# Patient Record
Sex: FEMALE | Race: WHITE | Employment: FULL TIME | URBAN - METROPOLITAN AREA
[De-identification: names, ages, dates, MRNs, and addresses within clinical notes are randomized per-mention and may not be internally consistent; named-entity substitution may affect disease eponyms.]

---

## 2018-06-12 ENCOUNTER — OFFICE VISIT (OUTPATIENT)
Dept: ORTHOPEDIC SURGERY | Facility: CLINIC | Age: 53
End: 2018-06-12

## 2018-06-12 VITALS
HEART RATE: 72 BPM | BODY MASS INDEX: 31.83 KG/M2 | DIASTOLIC BLOOD PRESSURE: 88 MMHG | RESPIRATION RATE: 18 BRPM | WEIGHT: 173 LBS | SYSTOLIC BLOOD PRESSURE: 145 MMHG | OXYGEN SATURATION: 99 % | HEIGHT: 62 IN | TEMPERATURE: 97.4 F

## 2018-06-12 DIAGNOSIS — M77.8 LEFT SHOULDER TENDONITIS: Primary | ICD-10-CM

## 2018-06-12 DIAGNOSIS — M75.52 ACUTE SHOULDER BURSITIS, LEFT: ICD-10-CM

## 2018-06-12 DIAGNOSIS — M25.512 ACUTE PAIN OF LEFT SHOULDER: ICD-10-CM

## 2018-06-12 RX ORDER — ASPIRIN 325 MG
TABLET, DELAYED RELEASE (ENTERIC COATED) ORAL
COMMUNITY

## 2018-06-12 RX ORDER — BISMUTH SUBSALICYLATE 262 MG
1 TABLET,CHEWABLE ORAL DAILY
COMMUNITY

## 2018-06-12 RX ORDER — MICONAZOLE NITRATE 2 %
CREAM WITH APPLICATOR VAGINAL 2 TIMES DAILY
COMMUNITY

## 2018-06-12 RX ORDER — METHYLPREDNISOLONE ACETATE 40 MG/ML
40 INJECTION, SUSPENSION INTRA-ARTICULAR; INTRALESIONAL; INTRAMUSCULAR; SOFT TISSUE ONCE
Qty: 1 VIAL | Refills: 0
Start: 2018-06-12 | End: 2018-06-12

## 2018-06-12 NOTE — PROGRESS NOTES
HISTORY OF PRESENT ILLNESS:  Liane Brown is a pleasant, 78-year-old, obese,  female, who presents with a two-month history of worsening left shoulder pain. She has a history dating back a couple of months ago of her dog, who was on a leash, that jerked as she was trying to put the animal in her car. The subsequent jerking caused her full extension to her left side of her left shoulder. She developed intermittent pain following that occurrence. She is now limited two months later with an inability to reach behind her back secondary to pain and stiffness. She also has difficulty raising her left arm above her head when compared to the right side. She has tried over-the-counter Tylenol and Motrin with no symptom relief or improvement in her range of motion. REVIEW OF SYSTEMS:  No chest pain. No shortness of breath. No fevers, chills, or night sweats. No rash and no itching. No nausea and no vomiting. Pain is per the HPI with the addition of decreased range of motion reaching forward and behind her. Her pain at rest is 2-3/10 to the left shoulder and with activities, particularly forward and internally rotating, the pain increases to upwards of a 7-8/10. PHYSICAL EXAM:  She is a healthy-appearing, well-developed, well-nourished, pleasant, 78-year-old, obese,  female, atraumatic, normocephalic, alert and oriented times three, sitting on the table comfortably. She is in a gown with toga presentation on the left. The left shoulder reveals tenderness over the distal AC joint. She is limited actively in her forward flexion to only 140° with a painful arc beginning at 110° to endpoint. She has 90° of glenohumeral abduction but begins with pain at 70° to endpoint. She has a negative empty can sign with the left upper extremity. She has poor internal rotation actively, which is barely at L4 when compared to the right, which is easily T2-T3.   Her passive external rotation on the left is painful throughout with a maximum endpoint at 25° passively. This is compared to the right, which is easily to 85° passively without pain. She has a positive impingement sign. RADIOGRAPHS:  X-rays today, three views of the right shoulder, reveal no evidence of osseous deformity. There are minimal osteoarthritic changes seen in the glenohumeral joint and minimal [facility]. There is no subluxation in the shoulder noted at the humeral head and the glenohumeral joint. IMPRESSION:      1. Left shoulder tendinitis/bursitis, impingement. Early impingement sign. 2. Decreased range of motion of the left shoulder. PROCEDURE:  Using sterile technique, after informed verbal and written consent were obtained and time out performed, 1 of Depo Medrol at 80 mg per mL mixed with 7 mL of Marcaine 0.25% was injected using the posterior subacromial approach. There were no complications. The patient tolerated the procedure well. PLAN:   I am currently recommending a low-dose cortisone injection for her left shoulder discomfort today. The patient is going to begin planned outpatient physical therapy services with all modalities with a focus on stretching and range of motion. We are going to see her back in about three weeks. Today, x-rays were reviewed, and all her questions were answered to her satisfaction. Copies were provided.

## 2018-06-13 ENCOUNTER — HOSPITAL ENCOUNTER (OUTPATIENT)
Dept: PHYSICAL THERAPY | Age: 53
Discharge: HOME | End: 2018-06-13
Payer: COMMERCIAL

## 2018-06-13 PROCEDURE — 97161 PT EVAL LOW COMPLEX 20 MIN: CPT | Performed by: PHYSICAL THERAPIST

## 2018-06-13 PROCEDURE — 97110 THERAPEUTIC EXERCISES: CPT | Performed by: PHYSICAL THERAPIST

## 2018-06-13 NOTE — PROGRESS NOTES
PT DAILY TREATMENT NOTE     Patient Name: Desire Vernon  Date:2018  : 1965  [x]  Patient  Verified  Payor: Simon Duval / [facility] / Product Type: Commerical /    In time:1205  Out time:1240  Total Treatment Time (min): 35  Visit #: 1 of 10    Treatment Area: Pain in left shoulder [M25.512]    SUBJECTIVE  Pain Level (0-10 scale): 4  Any medication changes, allergies to medications, adverse drug reactions, diagnosis change, or new procedure performed?: [x] No    [] Yes (see summary sheet for update)  Subjective functional status/changes:   [] No changes reported  See eval.    OBJECTIVE    10 min [x]Eval                  []Re-Eval       25 min Therapeutic Exercise:  [] See flow sheet :   Rationale: increase ROM, increase strength, improve coordination and increase proprioception to improve the patients ability to tolerate overhead and behind back tasks with reduced pain. With   [] TE   [] TA   [] neuro   [] other: Patient Education: [x] Review HEP    [] Progressed/Changed HEP based on:   [] positioning   [] body mechanics   [] transfers   [] heat/ice application    [] other:      Other Objective/Functional Measures: See eval.     Pain Level (0-10 scale) post treatment: 0    ASSESSMENT/Changes in Function:   [x]  See Plan of Care           Progress towards goals / Updated goals:  Short Term Goals: To be accomplished in 2 weeks:  1. Pt to report [place] with HEP. Eval status: HEP issued and reviewed physically/verbally with pt    Long Term Goals: To be accomplished in 4 weeks:  1. Pt to report score of 71 on FOTO, indicating improved tolerance to activity and reduced pain. Eval status: 56 on initial FOTO  2.   Pt to demonstrate full L shoulder AROM/PROM at 160 degrees flexion/abduction, 75 degrees ER (90/90), able to reach C7 with IR behind the back  Eval status: L shoulder AROM 131 degrees flexion, 101 degrees abduction, 58 degrees ER (90/90), able to reach to midline central LB with behind the back IR  3. Pt to be able to return to full PLOF at home and in the community, including swimming activities at home, with improved mobility, strength and stability. Eval status: limited by pain during swimming strokes  4. Pt to be able to perform overhead and upper body hygiene/dressing tasks without increased c/o pain or difficulty.   Eval status: limited by pain when attempting to fasten bra    PLAN  [x]  Upgrade activities as tolerated     [x]  Continue plan of care  []  Update interventions per flow sheet       []  Discharge due to:_  []  Other:_      Lakia Luna, PT 6/13/2018  12:30 PM    Future Appointments  Date Time Provider Jackie Osman   7/11/2018 8:30 AM VIVIANA Guillen

## 2018-06-13 NOTE — PROGRESS NOTES
[facility] [address]  [address] [facility] [address], [address] [phone] fax    Plan of Care/ Statement of Necessity for Physical Therapy Services           Patient name: Milagros Grullon Start of Care: 2018   Referral source: Thomas Ulloa MD : 1965    Medical Diagnosis: Pain in left shoulder [M25.512]   Onset Date:2018    Treatment Diagnosis: L shoulder pain   Prior Hospitalization: see medical history Provider#: [id number]   Medications: Verified on Patient summary List    Comorbidities: high BP, tobacco use, thyroid problems   Prior Level of Function: Pt reports [place] with all tasks. She lives at home with her family and a 16 month old labradoodle. She works full time at [employer] at [employer]. The Plan of Care and following information is based on the information from the initial evaluation. Assessment/ key information: Pt is a 44 y/o female who presents today with new onset of L shoulder pain beginning in 2018 that is slightly worsening/not improving. Pt reports that her onset of pain began when her puppy pulled her arm the wrong way while the puppy was on a leash. Pt has had x-rays which show no tears, and an injection yesterday, which slightly reduced her pain last night. Pt presents with reduced ROM and slight reduction in strength with MMT. Pt with negative impingement testing, lift off testing, drop arm testing, crank/clunk testing. She has very slight tenderness to palpation through the posterior shoulder. She exhibits altered scapulohumeral rhythm with increased shoulder elevation during motion. Pt would benefit from skilled PT intervention to address the above limitations and return her to full PLOF.   Evaluation Complexity History HIGH Complexity :3+ comorbidities / personal factors will impact the outcome/ POC ; Examination HIGH Complexity : 4+ Standardized tests and measures addressing body structure, function, activity limitation and / or participation in recreation  ;Presentation LOW Complexity : Stable, uncomplicated  ;Clinical Decision Making MEDIUM Complexity : FOTO score of 26-74  Overall Complexity Rating: LOW   Problem List: pain affecting function, decrease ROM, decrease strength, decrease ADL/ functional abilitiies, decrease activity tolerance and decrease flexibility/ joint mobility   Treatment Plan may include any combination of the following: Therapeutic exercise, Therapeutic activities, Neuromuscular re-education, Manual therapy, Patient education, Self Care training and Functional mobility training  Patient / Family readiness to learn indicated by: asking questions, trying to perform skills and interest  Persons(s) to be included in education: patient (P)  Barriers to Learning/Limitations: None  Patient Goal (s): regain ROM and get rid of pain  Patient Self Reported Health Status: good  Rehabilitation Potential: good  Short Term Goals: To be accomplished in 2 weeks:  1. Pt to report [place] with HEP. Eval status: HEP issued and reviewed physically/verbally with pt    Long Term Goals: To be accomplished in 4 weeks:  1. Pt to report score of 71 on FOTO, indicating improved tolerance to activity and reduced pain. Eval status: 56 on initial FOTO  2. Pt to demonstrate full L shoulder AROM/PROM at 160 degrees flexion/abduction, 75 degrees ER (90/90), able to reach C7 with IR behind the back  Eval status: L shoulder AROM 131 degrees flexion, 101 degrees abduction, 58 degrees ER (90/90), able to reach to midline central LB with behind the back IR  3. Pt to be able to return to full PLOF at home and in the community, including swimming activities at home, with improved mobility, strength and stability. Eval status: limited by pain during swimming strokes  4.   Pt to be able to perform overhead and upper body hygiene/dressing tasks without increased c/o pain or difficulty. Eval status: limited by pain when attempting to fasten bra  Frequency / Duration: Patient to be seen 2 times per week for 10 treatments. Patient/ Caregiver education and instruction: Diagnosis, prognosis, self care, activity modification and exercises   [x]  Plan of care has been reviewed with VLADIMIR Villegas, PT 6/13/2018 12:45 PM    ________________________________________________________________________    I certify that the above Therapy Services are being furnished while the patient is under my care. I agree with the treatment plan and certify that this therapy is necessary.     Physician's Signature:____________________  Date:____________Time: _________    Please sign and return to [facility] [address]  [address] [place], [address] [phone] fax

## 2018-06-15 ENCOUNTER — HOSPITAL ENCOUNTER (OUTPATIENT)
Dept: PHYSICAL THERAPY | Age: 53
Discharge: HOME | End: 2018-06-15
Payer: COMMERCIAL

## 2018-06-15 PROCEDURE — 97140 MANUAL THERAPY 1/> REGIONS: CPT

## 2018-06-15 PROCEDURE — 97110 THERAPEUTIC EXERCISES: CPT

## 2018-06-15 NOTE — PROGRESS NOTES
PT DAILY TREATMENT NOTE     Patient Name: Milagros Grullon  Date:6/15/2018  : 1965  [x]  Patient  Verified  Payor: Parveen Vera / [place]: [place] [facility] / Product Type: Commerical /    In time:834  Out time:910  Total Treatment Time (min): 36  Visit #: 2 of 10    Treatment Area: Pain in left shoulder [M25.512]    SUBJECTIVE  Pain Level (0-10 scale): 2  Any medication changes, allergies to medications, adverse drug reactions, diagnosis change, or new procedure performed?: [x] No    [] Yes (see summary sheet for update)  Subjective functional status/changes:   [] No changes reported  \"I have the most trouble with reaching out to the side and behind me. \"    OBJECTIVE    Modality rationale: patient declined   Min Type Additional Details    [] Estim:  []Unatt       []IFC  []Premod                        []Other:  []w/ice   []w/heat  Position:  Location:    [] Estim: []Att    []TENS instruct  []NMES                    []Other:  []w/US   []w/ice   []w/heat  Position:  Location:    []  Traction: [] Cervical       []Lumbar                       [] Prone          []Supine                       []Intermittent   []Continuous Lbs:  [] before manual  [] after manual    []  Ultrasound: []Continuous   [] Pulsed                           []1MHz   []3MHz W/cm2:  Location:    []  Iontophoresis with dexamethasone         Location: [] Take home patch   [] In clinic    []  Ice     []  heat  []  Ice massage  []  Laser   []  Anodyne Position:  Location:    []  Laser with stim  []  Other:  Position:  Location:    []  Vasopneumatic Device Pressure:       [] lo [] med [] hi   Temperature: [] lo [] med [] hi   [] Skin assessment post-treatment:  []intact []redness- no adverse reaction    []redness  adverse reaction:     28 min Therapeutic Exercise:  [x] See flow sheet :   Rationale: increase ROM and increase strength to improve the patients ability to perform ADLs    8 min Manual Therapy:  Sidelying scap mobs to left scapula, STM to left UT and medial border. PROM to left shoulder with gentle grade 1 & 2 mobs   Rationale: decrease pain, increase ROM, increase tissue extensibility, decrease trigger points and increase postural awareness to improve mobility and reaching        With   [x] TE   [] TA   [x] neuro   [] other: Patient Education: [x] Review HEP    [] Progressed/Changed HEP based on:   [x] positioning   [x] body mechanics   [] transfers   [] heat/ice application    [] other:      Other Objective/Functional Measures:      Pain Level (0-10 scale) post treatment: 0    ASSESSMENT/Changes in Function: Initiated POC to which pt responded well. She had full ROM through her left shoulder. She reports the most difficulty is with reaching into abduction and behind herself. Patient will continue to benefit from skilled PT services to modify and progress therapeutic interventions, address functional mobility deficits, address ROM deficits, address strength deficits, analyze and address soft tissue restrictions, analyze and cue movement patterns, analyze and modify body mechanics/ergonomics, assess and modify postural abnormalities, address imbalance/dizziness and instruct in home and community integration to attain remaining goals. [x]  See Plan of Care  []  See progress note/recertification  []  See Discharge Summary         Progress towards goals / Updated goals:  Short Term Goals: To be accomplished in 2 weeks:  1. Pt to report [place] with HEP. Eval status: HEP issued and reviewed physically/verbally with pt    MET  Long Term Goals: To be accomplished in 4 weeks:  1. Pt to report score of 71 on FOTO, indicating improved tolerance to activity and reduced pain. Eval status: 56 on initial FOTO  2.   Pt to demonstrate full L shoulder AROM/PROM at 160 degrees flexion/abduction, 75 degrees ER (90/90), able to reach C7 with IR behind the back   Eval status: L shoulder AROM 131 degrees flexion, 101 degrees abduction, 58 degrees ER (90/90), able to reach to midline central LB with behind the back IR  3. Pt to be able to return to full PLOF at home and in the community, including swimming activities at home, with improved mobility, strength and stability. Eval status: limited by pain during swimming strokes  4. Pt to be able to perform overhead and upper body hygiene/dressing tasks without increased c/o pain or difficulty.    Eval status: limited by pain when attempting to fasten bra    PLAN  []  Upgrade activities as tolerated     [x]  Continue plan of care  []  Update interventions per flow sheet       []  Discharge due to:_  []  Other:_      Nellie Alvarez, VLADIMIR, CSCS 6/15/2018  9:27 AM    Future Appointments  Date Time Provider Jackie Osman   6/18/2018 4:00 PM Gricelda Kenney PTA [facility]   6/20/2018 4:30 PM Chay Morris PT [facility]   6/25/2018 8:30 AM Nellie Alvarez PTA [facility]   6/27/2018 4:30 PM Yamile Marte PT [facility]   7/11/2018 8:30 AM VIVIANA Holliday 67

## 2018-06-18 ENCOUNTER — HOSPITAL ENCOUNTER (OUTPATIENT)
Dept: PHYSICAL THERAPY | Age: 53
Discharge: HOME | End: 2018-06-18
Payer: COMMERCIAL

## 2018-06-18 PROCEDURE — 97110 THERAPEUTIC EXERCISES: CPT

## 2018-06-18 PROCEDURE — 97140 MANUAL THERAPY 1/> REGIONS: CPT

## 2018-06-18 NOTE — PROGRESS NOTES
PT DAILY TREATMENT NOTE     Patient Name: Nay Main  Date:2018  : 1965  [x]  Patient  Verified  Payor: Rito Going / [place]: [place] [facility]  [facility] PT / Product Type: Commerical /    In time:4:00  Out time:4:35  Total Treatment Time (min): 35  Visit #: 3 of 10    Treatment Area: Pain in left shoulder [M25.512]    SUBJECTIVE  Pain Level (0-10 scale): 1-2  Any medication changes, allergies to medications, adverse drug reactions, diagnosis change, or new procedure performed?: [x] No    [] Yes (see summary sheet for update)  Subjective functional status/changes:   [] No changes reported  \"It's getting better. \"  OBJECTIVE    Modality rationale: patient declined   Min Type Additional Details    [] Estim:  []Unatt       []IFC  []Premod                        []Other:  []w/ice   []w/heat  Position:  Location:    [] Estim: []Att    []TENS instruct  []NMES                    []Other:  []w/US   []w/ice   []w/heat  Position:  Location:    []  Traction: [] Cervical       []Lumbar                       [] Prone          []Supine                       []Intermittent   []Continuous Lbs:  [] before manual  [] after manual    []  Ultrasound: []Continuous   [] Pulsed                           []1MHz   []3MHz W/cm2:  Location:    []  Iontophoresis with dexamethasone         Location: [] Take home patch   [] In clinic    []  Ice     []  heat  []  Ice massage  []  Laser   []  Anodyne Position:  Location:    []  Laser with stim  []  Other:  Position:  Location:    []  Vasopneumatic Device Pressure:       [] lo [] med [] hi   Temperature: [] lo [] med [] hi   [] Skin assessment post-treatment:  []intact []redness- no adverse reaction    []redness  adverse reaction:     27 min Therapeutic Exercise:  [x] See flow sheet :   Rationale: increase ROM and increase strength to improve the patients ability to perform ADLs    8 min Manual Therapy:  Sidelying scap mobs to left scapula, STM to left UT and medial border.  PROM to left shoulder with gentle grade 1 & 2 mobs   Rationale: decrease pain, increase ROM, increase tissue extensibility, decrease trigger points and increase postural awareness to improve mobility and reaching        With   [x] TE   [] TA   [x] neuro   [] other: Patient Education: [x] Review HEP    [] Progressed/Changed HEP based on:   [x] positioning   [x] body mechanics   [] transfers   [] heat/ice application    [] other:      Other Objective/Functional Measures:      Pain Level (0-10 scale) post treatment: 1/10    ASSESSMENT/Changes in Function:Continued with current ex. Per flow sheet. Pt reported a slight decrease in pain after therapy. Patient will continue to benefit from skilled PT services to modify and progress therapeutic interventions, address functional mobility deficits, address ROM deficits, address strength deficits, analyze and address soft tissue restrictions, analyze and cue movement patterns, analyze and modify body mechanics/ergonomics, assess and modify postural abnormalities, address imbalance/dizziness and instruct in home and community integration to attain remaining goals. [x]  See Plan of Care  []  See progress note/recertification  []  See Discharge Summary         Progress towards goals / Updated goals:  Short Term Goals: To be accomplished in 2 weeks:  1. Pt to report [place] with HEP. Eval status: HEP issued and reviewed physically/verbally with pt    MET  Long Term Goals: To be accomplished in 4 weeks:  1. Pt to report score of 71 on FOTO, indicating improved tolerance to activity and reduced pain. Eval status: 56 on initial FOTO  2. Pt to demonstrate full L shoulder AROM/PROM at 160 degrees flexion/abduction, 75 degrees ER (90/90), able to reach C7 with IR behind the back   Eval status: L shoulder AROM 131 degrees flexion, 101 degrees abduction, 58 degrees ER (90/90), able to reach to midline central LB with behind the back IR  3.   Pt to be able to return to full PLOF at home and in the community, including swimming activities at home, with improved mobility, strength and stability. Eval status: limited by pain during swimming strokes  4. Pt to be able to perform overhead and upper body hygiene/dressing tasks without increased c/o pain or difficulty.    Eval status: limited by pain when attempting to fasten bra    PLAN  []  Upgrade activities as tolerated     [x]  Continue plan of care  []  Update interventions per flow sheet       []  Discharge due to:_  []  Other:_      Lolis Mack PTA 6/18/2018  9:27 AM    Future Appointments  Date Time Provider Jackie Osman   6/20/2018 4:30 PM Cyndi Kwong [facility]   6/25/2018 8:30 AM Devang Pereira PTA [facility]   6/27/2018 4:30 PM Meet Fernandes PT [facility]   7/11/2018 8:30 AM VIVIANA Navarro [place] [address]

## 2018-06-20 ENCOUNTER — HOSPITAL ENCOUNTER (OUTPATIENT)
Dept: PHYSICAL THERAPY | Age: 53
Discharge: HOME | End: 2018-06-20
Payer: COMMERCIAL

## 2018-06-20 PROCEDURE — 97110 THERAPEUTIC EXERCISES: CPT | Performed by: PHYSICAL THERAPIST

## 2018-06-20 PROCEDURE — 97140 MANUAL THERAPY 1/> REGIONS: CPT | Performed by: PHYSICAL THERAPIST

## 2018-06-20 NOTE — PROGRESS NOTES
PT DAILY TREATMENT NOTE     Patient Name: Joe Sahni  Date:2018  : 1965  [x]  Patient  Verified  Payor: Iron Lemus / [place]: [place]  [facility] Product Type: Commer[facility] /    In time:430  Out time:524  Total Treatment Time (min): 47  Visit #: 4 of 10    Treatment Area: Pain in left shoulder [M25.512]    SUBJECTIVE  Pain Level (0-10 scale): 1  Any medication changes, allergies to medications, adverse drug reactions, diagnosis change, or new procedure performed?: [x] No    [] Yes (see summary sheet for update)  Subjective functional status/changes:   [] No changes reported  \"Just a 1/10 today. \"    OBJECTIVE    46 min Therapeutic Exercise:  [] See flow sheet :   Rationale: increase ROM, increase strength, improve coordination and increase proprioception to improve the patients ability to tolerate L UE tasks with reduced pain and improved mobility/stability. 8 min Manual Therapy:  S/L scapulothoracic mobs, supine shoulder PROM in all planes; grade III-IV mobilizations to improve ER   Rationale: increase ROM, increase tissue extensibility, decrease trigger points and increase postural awareness to tolerate L UE tasks with reduced pain and improved mobility/stability. With   [] TE   [] TA   [] neuro   [] other: Patient Education: [x] Review HEP    [] Progressed/Changed HEP based on:   [] positioning   [] body mechanics   [] transfers   [] heat/ice application    [] other:      Other Objective/Functional Measures: Pt requires demonstrations/VC for new exercises and progressions     Pain Level (0-10 scale) post treatment: 0    ASSESSMENT/Changes in Function: Pt notes benefit from PT thus far and is exhibiting improved tolerance to activity, strength, and AROM/PROM. She notes reduced ER at end range 90/90 position, which PT targeted during manual intervention today. Pt notes 0/10 pain after manual PROM/mobs today.     Patient will continue to benefit from skilled PT services to modify and progress therapeutic interventions, address functional mobility deficits, address ROM deficits, address strength deficits, analyze and address soft tissue restrictions, analyze and cue movement patterns, analyze and modify body mechanics/ergonomics and assess and modify postural abnormalities to attain remaining goals. Progress towards goals / Updated goals:  Short Term Goals: To be accomplished in 2 weeks:  1.  Pt to report [place] with HEP. Eval status: HEP issued and reviewed physically/verbally with pt                        MET  Long Term Goals: To be accomplished in 4 weeks: 1.    Pt to report score of 71 on FOTO, indicating improved tolerance to activity and reduced pain. Eval status: 56 on initial FOTO  2.  Pt to demonstrate full L shoulder AROM/PROM at 160 degrees flexion/abduction, 75 degrees ER (90/90), able to reach C7 with IR behind the back                        Eval status: L shoulder AROM 131 degrees flexion, 101 degrees abduction, 58 degrees ER (90/90), able to reach to midline central LB with behind the back IR  3.  Pt to be able to return to full PLOF at home and in the community, including swimming activities at home, with improved mobility, strength and stability. Eval status: limited by pain during swimming strokes  4.  Pt to be able to perform overhead and upper body hygiene/dressing tasks without increased c/o pain or difficulty.                         Eval status: limited by pain when attempting to fasten bra    PLAN  [x]  Upgrade activities as tolerated     [x]  Continue plan of care  []  Update interventions per flow sheet       []  Discharge due to:_  []  Other:_      Obinna Daley, PT 6/20/2018  4:34 PM    Future Appointments  Date Time Provider Jackie Osman   6/25/2018 8:30 AM [facility]   6/27/2018 4:30 PM [facility]   7/11/2018 8:30 AM VIVIANA Ruby Dylon 67

## 2018-06-25 ENCOUNTER — HOSPITAL ENCOUNTER (OUTPATIENT)
Dept: PHYSICAL THERAPY | Age: 53
Discharge: HOME | End: 2018-06-25
Payer: COMMERCIAL

## 2018-06-25 PROCEDURE — 97112 NEUROMUSCULAR REEDUCATION: CPT

## 2018-06-25 PROCEDURE — 97110 THERAPEUTIC EXERCISES: CPT

## 2018-06-25 PROCEDURE — 97140 MANUAL THERAPY 1/> REGIONS: CPT

## 2018-06-25 NOTE — PROGRESS NOTES
PT DAILY TREATMENT NOTE     Patient Name: Nay Main  Date:2018  : 1965  [x]  Patient  Verified  Payor: Rito Going / [facility] [place] [facility]  [facility] Type: Com[facility]ical /    In time:835  Out time:921  Total Treatment Time (min): 46  Visit #: 5 of 10    Treatment Area: Pain in left shoulder [M25.512]    SUBJECTIVE  Pain Level (0-10 scale): 0  Any medication changes, allergies to medications, adverse drug reactions, diagnosis change, or new procedure performed?: [x] No    [] Yes (see summary sheet for update)  Subjective functional status/changes:   [] No changes reported  \"No pain. \"    OBJECTIVE    Modality rationale: patient declined   Min Type Additional Details    [] Estim:  []Unatt       []IFC  []Premod                        []Other:  []w/ice   []w/heat  Position:  Location:    [] Estim: []Att    []TENS instruct  []NMES                    []Other:  []w/US   []w/ice   []w/heat  Position:  Location:    []  Traction: [] Cervical       []Lumbar                       [] Prone          []Supine                       []Intermittent   []Continuous Lbs:  [] before manual  [] after manual    []  Ultrasound: []Continuous   [] Pulsed                           []1MHz   []3MHz W/cm2:  Location:    []  Iontophoresis with dexamethasone         Location: [] Take home patch   [] In clinic    []  Ice     []  heat  []  Ice massage  []  Laser   []  Anodyne Position:  Location:    []  Laser with stim  []  Other:  Position:  Location:    []  Vasopneumatic Device Pressure:       [] lo [] med [] hi   Temperature: [] lo [] med [] hi   [] Skin assessment post-treatment:  []intact []redness- no adverse reaction    []redness  adverse reaction:     10 min Therapeutic Exercise:  [x] See flow sheet :   Rationale: increase ROM and increase strength to improve the patients ability to perform ADLs    28 min Neuromuscular Re-education:  [x]  See flow sheet : scap re-ed activities   Rationale: increase ROM, increase strength, improve coordination, improve balance and increase proprioception  to improve the patients ability to improve mobility and reaching    8 min Manual Therapy:  Sidelying scap mobs to left scapula, STM to left UT and medial border. PROM to left shoulder with grade 3 & 4 mobs   Rationale: decrease pain, increase ROM, increase tissue extensibility, decrease trigger points and increase postural awareness to improve mobility and reaching        With   [x] TE   [] TA   [x] neuro   [] other: Patient Education: [x] Review HEP    [] Progressed/Changed HEP based on:   [x] positioning   [x] body mechanics   [] transfers   [] heat/ice application    [] other:      Other Objective/Functional Measures:    FOTO 70  AROM elevation left shoulder 145 deg    Pain Level (0-10 scale) post treatment: 0    ASSESSMENT/Changes in Function: Pt is making wonderful progress with reaching overhead and with IR and ER. She reports completing yardwork over the weekend without rebound pain. Patient will continue to benefit from skilled PT services to modify and progress therapeutic interventions, address functional mobility deficits, address ROM deficits, address strength deficits, analyze and address soft tissue restrictions, analyze and cue movement patterns, analyze and modify body mechanics/ergonomics, assess and modify postural abnormalities, address imbalance/dizziness and instruct in home and community integration to attain remaining goals. [x]  See Plan of Care  []  See progress note/recertification  []  See Discharge Summary         Progress towards goals / Updated goals:  Short Term Goals: To be accomplished in 2 weeks:  1.  Pt to report [place] with HEP. Eval status: HEP issued and reviewed physically/verbally with pt   MET  Long Term Goals: To be accomplished in 4 weeks: 1.    Pt to report score of 71 on FOTO, indicating improved tolerance to activity and reduced pain.    Eval status: 56 on initial FOTO   PROGRESSING; 70  2.  Pt to demonstrate full L shoulder AROM/PROM at 160 degrees flexion/abduction, 75 degrees ER (90/90), able to reach C7 with IR behind the back   Eval status: L shoulder AROM 131 degrees flexion, 101 degrees abduction, 58 degrees ER (90/90), able to reach to midline central LB with behind the back IR   145 dg elevation  3.  Pt to be able to return to full PLOF at home and in the community, including swimming activities at home, with improved mobility, strength and stability. Eval status: limited by pain during swimming strokes   Reports improvements with yard work  4.  Pt to be able to perform overhead and upper body hygiene/dressing tasks without increased c/o pain or difficulty.    Eval status: limited by pain when attempting to fasten bra   Improving with reaching behind self    PLAN  []  Upgrade activities as tolerated     [x]  Continue plan of care  []  Update interventions per flow sheet       []  Discharge due to:_  []  Other:_      Reji Hilton PTA, CSCS 6/25/2018  9:28 AM    Future Appointments  Date Time Provider Jackie Osman   6/27/2018 4:30 PM Marla Miranda PT [facility]   7/11/2018 8:30 AM VIVIANA Salgado

## 2018-06-27 ENCOUNTER — HOSPITAL ENCOUNTER (OUTPATIENT)
Dept: PHYSICAL THERAPY | Age: 53
Discharge: HOME | End: 2018-06-27
Payer: COMMERCIAL

## 2018-06-27 PROCEDURE — 97112 NEUROMUSCULAR REEDUCATION: CPT

## 2018-06-27 PROCEDURE — 97110 THERAPEUTIC EXERCISES: CPT

## 2018-06-27 NOTE — PROGRESS NOTES
PT DISCHARGE DAILY NOTE AND [id number]    Date:2018  Patient name: Reinier Mcintyre Start of Care: 2018   Referral source: Madyson Maria MD : 1965                          Medical Diagnosis: Pain in left shoulder [M25.512] Onset Date:2018                          Treatment Diagnosis: L shoulder pain   Prior Hospitalization: see medical history Provider#: [id number]   Medications: Verified on Patient summary List    Comorbidities: high BP, tobacco use, thyroid problems   Prior Level of Function: Pt reports [place] with all tasks. She lives at home with her family and a 16 month old labradoodle. She works full time at [employer] at [employer]. Visits from Start of Care: 7    Missed Visits: 0    Reporting Period : 18 to 18    [x]  Patient  Verified  Payor: Arsalan Yancey / Plan: [address] PT / Product Type: Commerical /    In time:430  Out time:510  Total Treatment Time (min): 40  Visit #: 6 of 10    SUBJECTIVE  Pain Level (0-10 scale): 0  Any medication changes, allergies to medications, adverse drug reactions, diagnosis change, or new procedure performed?: [x] No    [] Yes (see summary sheet for update)  Subjective functional status/changes:   [] No changes reported  \"I'm doing much better, I feel like I can do pretty much everything. \"    OBJECTIVE    10 min Therapeutic Exercise:  [x] See flow sheet :   Rationale: increase ROM and increase strength to improve the patients ability to perform ADLs      30 min Neuromuscular Re-education:  [x]  See flow sheet : scap re-ed activities   Rationale: increase ROM, increase strength, improve coordination, improve balance and increase proprioception  to improve the patients ability to improve mobility and reaching                With   [] TE   [] TA   [] neuro   [] other: Patient Education: [x] Review HEP    [] Progressed/Changed HEP based on:   [] positioning   [] body mechanics   [] transfers   [] heat/ice application    [] other:      Other Objective/Functional Measures: FOTO 77     Pain Level (0-10 scale) post treatment: 0    Summary of Care:  Short Term Goals: To be accomplished in 2 weeks:  1.  Pt to report [place] with HEP. Eval status: HEP issued and reviewed physically/verbally with pt                        MET  Long Term Goals: To be accomplished in 4 weeks: 1.    Pt to report score of 71 on FOTO, indicating improved tolerance to activity and reduced pain. Eval status: 56 on initial FOTO                     MET 77  2.  Pt to demonstrate full L shoulder AROM/PROM at 160 degrees flexion/abduction, 75 degrees ER (90/90), able to reach C7 with IR behind the back                        Eval status: L shoulder AROM 131 degrees flexion, 101 degrees abduction, 58 degrees ER (90/90), able to reach to midline central LB with behind the back IR                       MET; FULL ROM  3.  Pt to be able to return to full PLOF at home and in the community, including swimming activities at home, with improved mobility, strength and stability. Eval status: limited by pain during swimming strokes                        MET Reports improvements with yard work  4.  Pt to be able to perform overhead and upper body hygiene/dressing tasks without increased c/o pain or difficulty. Eval status: limited by pain when attempting to fasten bra                       MET Improving with reaching behind self - no limitations    ASSESSMENT/Changes in Function: Ms. Jenny Vaughn has responded well to PT for improved ROM & pain. Patient reports no further deficits. We will discharge to [facility] at this time for maintenance of progress seen in therapy.      Thank you for this referral!      PLAN  [x]Discontinue therapy: [x]Patient has reached or is progressing toward set goals      []Patient is non-compliant or has abdicated      []Due to lack of appreciable progress towards set goals    Jonathan Lee, PT 6/27/2018  5:14 PM

## 2018-07-11 ENCOUNTER — OFFICE VISIT (OUTPATIENT)
Dept: ORTHOPEDIC SURGERY | Facility: CLINIC | Age: 53
End: 2018-07-11

## 2018-07-11 VITALS
DIASTOLIC BLOOD PRESSURE: 85 MMHG | WEIGHT: 172.8 LBS | BODY MASS INDEX: 31.8 KG/M2 | HEIGHT: 62 IN | SYSTOLIC BLOOD PRESSURE: 135 MMHG | HEART RATE: 67 BPM | RESPIRATION RATE: 16 BRPM | OXYGEN SATURATION: 97 % | TEMPERATURE: 97.7 F

## 2018-07-11 DIAGNOSIS — M75.52 ACUTE SHOULDER BURSITIS, LEFT: ICD-10-CM

## 2018-07-11 DIAGNOSIS — M77.8 LEFT SHOULDER TENDONITIS: ICD-10-CM

## 2018-07-11 DIAGNOSIS — M25.512 ACUTE PAIN OF LEFT SHOULDER: Primary | ICD-10-CM

## 2018-07-11 NOTE — PROGRESS NOTES
HISTORY OF PRESENT ILLNESS:  Ifeanyi Holloway returns following for left shoulder discomfort with decreased range of motion. She has completed seven sessions of physical therapy and has retained complete, painless motion of her left shoulder. She does describe some tenderness but cannot isolate the area where it is occurring. PHYSICAL EXAM:  There is, on forward flexion, easily 180° of motion without pain. Passive internal rotation, easily, is 60°. Passive external rotation is 50° without pain in either plane of motion. PLAN:   The patient is going to continue her home exercises. At this point, she does not need any further physical therapy, outpatient. All her questions were answered to her satisfaction. A copy of her physical therapy note was reviewed.

## 2018-10-05 ENCOUNTER — OFFICE VISIT (OUTPATIENT)
Dept: ORTHOPEDIC SURGERY | Facility: CLINIC | Age: 53
End: 2018-10-05

## 2018-10-05 VITALS
OXYGEN SATURATION: 98 % | DIASTOLIC BLOOD PRESSURE: 88 MMHG | SYSTOLIC BLOOD PRESSURE: 149 MMHG | BODY MASS INDEX: 32.94 KG/M2 | HEART RATE: 73 BPM | WEIGHT: 179 LBS | RESPIRATION RATE: 16 BRPM | HEIGHT: 62 IN | TEMPERATURE: 97.4 F

## 2018-10-05 DIAGNOSIS — M25.512 ACUTE PAIN OF LEFT SHOULDER: Primary | ICD-10-CM

## 2018-10-05 DIAGNOSIS — M77.8 LEFT SHOULDER TENDONITIS: ICD-10-CM

## 2018-10-05 DIAGNOSIS — M75.52 ACUTE SHOULDER BURSITIS, LEFT: ICD-10-CM

## 2018-10-05 DIAGNOSIS — M25.612 DECREASED RANGE OF MOTION OF LEFT SHOULDER: ICD-10-CM

## 2018-10-05 RX ORDER — DEXTROMETHORPHAN HYDROBROMIDE, GUAIFENESIN 5; 100 MG/5ML; MG/5ML
650 LIQUID ORAL EVERY 8 HOURS
COMMUNITY

## 2018-10-05 RX ORDER — TRIAMCINOLONE ACETONIDE 40 MG/ML
40 INJECTION, SUSPENSION INTRA-ARTICULAR; INTRAMUSCULAR ONCE
Qty: 1 ML | Refills: 0
Start: 2018-10-05 | End: 2018-10-05

## 2018-10-05 RX ORDER — CHOLECALCIFEROL (VITAMIN D3) 125 MCG
CAPSULE ORAL
COMMUNITY

## 2018-10-05 RX ORDER — DICLOFENAC SODIUM 75 MG/1
75 TABLET, DELAYED RELEASE ORAL 2 TIMES DAILY
Qty: 60 TAB | Refills: 0 | Status: SHIPPED | OUTPATIENT
Start: 2018-10-05 | End: 2018-10-05 | Stop reason: SDUPTHER

## 2018-10-05 RX ORDER — IBUPROFEN 200 MG
TABLET ORAL
COMMUNITY

## 2018-10-05 RX ORDER — DICLOFENAC SODIUM 75 MG/1
75 TABLET, DELAYED RELEASE ORAL 2 TIMES DAILY
Qty: 60 TAB | Refills: 0 | Status: SHIPPED | OUTPATIENT
Start: 2018-10-05

## 2018-10-05 NOTE — PROGRESS NOTES
HISTORY OF PRESENT ILLNESS:  John Turner returns to the office complaining of a recurrence of left shoulder pain with decreased range of motion. She was last seen on July 11, 2018, at which time she was provided a cortisone injection to the left shoulder for bursitis/early impingement syndrome. She had follow-on physical therapy, which essentially resolved her decreased range of motion with pain. The initial cause for her decreased range of motion was likely related prior to the July 11, 2018, visit with an occurrence when she was walking her dog and the dog bounded causing the leash, which was around her left arm, to pull her left arm abruptly. Today, she would like a repeat cortisone injection to the shoulder. REVIEW OF SYSTEMS:  No chest pain. No shortness of breath. No fevers, chills, or night sweats. No rash and no itching. No nausea and no vomiting. She is not a diabetic. She is not allergic to Betadine. Her pain to the left shoulder at rest is 7-8/10 and with activity, the pain increases to upwards of an 8-9/10. She has difficulty reaching behind her back secondary to the discomfort and cannot reach essentially above her neck line with her arm forward. She denies any give way sensation of the shoulder. PHYSICAL EXAM:  She is a healthy-appearing, well-developed, well-nourished, pleasant, 17-year-old,  female, atraumatic, normocephalic, alert and oriented times three, sitting on the table comfortably. Examination of the left shoulder reveals slight tenderness over the distal AC joint. She has active forward flexion limited secondary to pain and guarding at only 120°. Her passive external rotation is painful at 25°. Internal rotation is actively poor at barely the posterior aspect of the left iliac crest.  Glenohumeral abduction is untested.   Essentially, she is guarding through empty can sign, so there is difficulty in assessing truly an internal pathology associated with the anterior rotator cuff complex. IMPRESSION:      1. Recurrent left shoulder pain. 2. History of injury dating back summer 2018 involving a dog and leash, as well as a hyperextension injury to the left shoulder treated with cortisone and physical therapy with near-complete resolution. 3. Decreased range of motion of the left shoulder secondary to this reoccurrence of pain. PROCEDURE:  Today, using sterile technique, after verbal and written consent were obtained and appropriate time out performed, 1 cc of Kenalog at 40 mg per mL mixed with 7 mL of Sensorcaine 0.75% was injected in the left shoulder using the posterior subacromial approach. There were no complications. The patient tolerated the procedure well. PLAN:   I am currently recommending a low-dose cortisone injection for her left shoulder symptoms. In light of this reoccurrence and with the high suspicion that there may be an internal derangement noted to the shoulder, which is not clinically defined easily, I am going to order an MRI to assess the internal structures. The patient is going to follow back with our office once the MRI is completed. She was given a prescription for Voltaren enteric-coated tablets 75 mg to take one po b.i.d.

## 2018-10-24 ENCOUNTER — HOSPITAL ENCOUNTER (OUTPATIENT)
Dept: MRI IMAGING | Age: 53
Discharge: HOME | End: 2018-10-24
Attending: PHYSICIAN ASSISTANT
Payer: COMMERCIAL

## 2018-10-24 DIAGNOSIS — M25.612 DECREASED RANGE OF MOTION OF LEFT SHOULDER: ICD-10-CM

## 2018-10-24 DIAGNOSIS — M25.512 ACUTE PAIN OF LEFT SHOULDER: ICD-10-CM

## 2018-10-24 PROCEDURE — 73221 MRI JOINT UPR EXTREM W/O DYE: CPT

## 2018-11-01 ENCOUNTER — OFFICE VISIT (OUTPATIENT)
Dept: ORTHOPEDIC SURGERY | Facility: CLINIC | Age: 53
End: 2018-11-01

## 2018-11-01 DIAGNOSIS — M25.512 ACUTE PAIN OF LEFT SHOULDER: Primary | ICD-10-CM

## 2018-11-01 DIAGNOSIS — M24.112 LABRAL TEAR OF SHOULDER, DEGENERATIVE, LEFT: ICD-10-CM

## 2018-11-01 DIAGNOSIS — M77.8 LEFT SHOULDER TENDONITIS: ICD-10-CM

## 2018-11-01 NOTE — PROGRESS NOTES
HISTORY OF PRESENT ILLNESS:  Rajeev Peterson returns following for left shoulder treatment with a cortisone injection and home exercises. She was ordered an MRI to assess for internal derangement. The MRI report is as below. She is doing better today. She is continuing her exercises. Her pain is improved. Her motion is improved. She is pleased with her outcome today date. PHYSICAL EXAM:  With regards to her motion, she has 140° of forward flexion actively. Her passive external rotation is 30° without pain in either plane of motion. Internal rotation is to L2 on the left. Glenohumeral abduction is 110° actively. There is also no pain in those planes of motion either. PLAN:   At this point, we will follow her on a prn basis. I did discuss with her doctor, Breanne Decker M.D., being the shoulder surgeon for our practice, and if this returns, an appointment with him may be necessary to assess the labrum chronic tear/degeneration. Today, all her questions were answered to her satisfaction. A copy of her MRI was reviewed and provided. She will follow up prn.                   HISTORY: Pain. Limited movement.     Supraspinatus, infraspinatus, and subscapularis tendons are intact. No  inflammation or muscular atrophy. No significant subacromial bursitis.     No glenohumeral joint effusion. Diffuse thinning of the cartilage. Attenuated  superior labrum with mild edema along the superior portion of the posterior  labrum, also blunted. Mild inflammation with thickening of the inferior  glenohumeral ligament. The rotator cuff interval shows mild inflammation. Biceps  tendon and anchor are intact.     Slightly curved acromion undersurface. Mild hypertrophy and inflammation at the  acromioclavicular joint with no significant mass effect. Minimal inflammation  with no AC joint effusion.     IMPRESSION  IMPRESSION:  1. No rotator cuff tendon tear.   2. Attenuated superior and superior posterior labrum: chronic tear/degeneration. 3. Thickening of the inferior glenohumeral ligament. Mild inflammation in the  rotator interval, common findings seen in adhesive capsulitis, clinical  correlation? 4. Type II acromion with mild hypertrophy and inflammation at the [facility] joint.

## 2018-12-07 ENCOUNTER — HOSPITAL ENCOUNTER (OUTPATIENT)
Dept: LAB | Age: 53
Discharge: HOME | End: 2018-12-07

## 2018-12-07 PROCEDURE — 88305 TISSUE EXAM BY PATHOLOGIST: CPT

## 2023-03-20 RX ORDER — FLUTICASONE PROPIONATE 50 MCG
1 SPRAY, SUSPENSION (ML) NASAL DAILY PRN
COMMUNITY
Start: 2020-03-10

## 2023-03-20 RX ORDER — LEVOTHYROXINE SODIUM 0.05 MG/1
TABLET ORAL
COMMUNITY
Start: 2023-02-01

## 2023-03-20 RX ORDER — ACETAMINOPHEN 500 MG
500 TABLET ORAL EVERY 6 HOURS PRN
COMMUNITY

## 2023-03-20 RX ORDER — MINOXIDIL 2.5 MG/1
TABLET ORAL
COMMUNITY
Start: 2023-01-13

## 2023-03-20 NOTE — PERIOP NOTE
PRE-SURGICAL INSTRUCTIONS        Patient's Name:  Casie Beth      UIGNX'J Date:  3/20/2023            Covid Testing Date and Time:    Surgery Date:  3/23/2023                Do NOT eat or drink anything, including candy, gum, or ice chips after midnight on 3/22/2023, unless you have specific instructions from your surgeon or anesthesia provider to do so. You may brush your teeth before coming to the hospital.  No smoking 24 hours prior to the day of surgery. No alcohol 24 hours prior to the day of surgery. No recreational drugs for one week prior to the day of surgery. Leave all valuables, including money/purse, at home. Remove all jewelry, nail polish, acrylic nails, and makeup (including mascara); no lotions powders, deodorant, or perfume/cologne/after shave on the skin. Follow instruction for Hibiclens washes and CHG wipes from surgeon's office. Glasses/contact lenses and dentures may be worn to the hospital.  They will be removed prior to surgery. Call your doctor if symptoms of a cold or illness develop within 24-48 hours prior to your surgery. 11.  If you are having an outpatient procedure, please make arrangements for a responsible ADULT TO [address] [place] [address] and stay with you for 24 hours after your surgery. 12. ONE VISITOR in the hospital at this time for outpatient procedures. Exceptions may be made for surgical admissions, per nursing unit guidelines      Special Instructions:      Bring list of CURRENT medications. Bring any pertinent legal medical records. Take these medications the morning of surgery with a sip of water:  As directed by physician  Follow physician instructions about stopping anticoagulants. Complete bowel prep per MD instructions. On the day of surgery, come in the main entrance of [facility]. Let the  at the desk know you are there for surgery.   A staff member will come escort you to the surgical area on the second floor.    If you have any questions or concerns, please do not hesitate to call:     (Prior to the day of surgery) PAT department:  [phone]   (Day of surgery) Pre-Op department:  [phone]    These surgical instructions were reviewed with patient during the PAT phone call.

## 2023-03-22 ENCOUNTER — ANESTHESIA EVENT (OUTPATIENT)
Facility: HOSPITAL | Age: 58
End: 2023-03-22
Payer: COMMERCIAL

## 2023-03-23 ENCOUNTER — ANESTHESIA (OUTPATIENT)
Facility: HOSPITAL | Age: 58
End: 2023-03-23
Payer: COMMERCIAL

## 2023-03-23 ENCOUNTER — HOSPITAL ENCOUNTER (OUTPATIENT)
Facility: HOSPITAL | Age: 58
Setting detail: OUTPATIENT SURGERY
Discharge: HOME | End: 2023-03-23
Attending: INTERNAL MEDICINE | Admitting: INTERNAL MEDICINE
Payer: COMMERCIAL

## 2023-03-23 VITALS
HEIGHT: 62 IN | SYSTOLIC BLOOD PRESSURE: 138 MMHG | WEIGHT: 160.1 LBS | HEART RATE: 63 BPM | RESPIRATION RATE: 17 BRPM | BODY MASS INDEX: 29.46 KG/M2 | DIASTOLIC BLOOD PRESSURE: 76 MMHG | OXYGEN SATURATION: 96 % | TEMPERATURE: 98.1 F

## 2023-03-23 PROCEDURE — 3700000000 HC ANESTHESIA ATTENDED CARE: Performed by: INTERNAL MEDICINE

## 2023-03-23 PROCEDURE — 3600007502: Performed by: INTERNAL MEDICINE

## 2023-03-23 PROCEDURE — 7100000011 HC PHASE II RECOVERY - ADDTL 15 MIN: Performed by: INTERNAL MEDICINE

## 2023-03-23 PROCEDURE — 3700000001 HC ADD 15 MINUTES (ANESTHESIA): Performed by: INTERNAL MEDICINE

## 2023-03-23 PROCEDURE — 7100000010 HC PHASE II RECOVERY - FIRST 15 MIN: Performed by: INTERNAL MEDICINE

## 2023-03-23 PROCEDURE — 6360000002 HC RX W HCPCS: Performed by: ANESTHESIOLOGY

## 2023-03-23 PROCEDURE — 2580000003 HC RX 258: Performed by: ANESTHESIOLOGY

## 2023-03-23 PROCEDURE — 7100000000 HC PACU RECOVERY - FIRST 15 MIN: Performed by: INTERNAL MEDICINE

## 2023-03-23 PROCEDURE — 3600007512: Performed by: INTERNAL MEDICINE

## 2023-03-23 PROCEDURE — 2709999900 HC NON-CHARGEABLE SUPPLY: Performed by: INTERNAL MEDICINE

## 2023-03-23 RX ORDER — SODIUM CHLORIDE 0.9 % (FLUSH) 0.9 %
5-40 SYRINGE (ML) INJECTION EVERY 12 HOURS SCHEDULED
Status: DISCONTINUED | OUTPATIENT
Start: 2023-03-23 | End: 2023-03-23 | Stop reason: HOSPADM

## 2023-03-23 RX ORDER — SODIUM CHLORIDE, SODIUM LACTATE, POTASSIUM CHLORIDE, CALCIUM CHLORIDE 600; 310; 30; 20 MG/100ML; MG/100ML; MG/100ML; MG/100ML
INJECTION, SOLUTION INTRAVENOUS CONTINUOUS PRN
Status: DISCONTINUED | OUTPATIENT
Start: 2023-03-23 | End: 2023-03-23 | Stop reason: SDUPTHER

## 2023-03-23 RX ORDER — SODIUM CHLORIDE, SODIUM LACTATE, POTASSIUM CHLORIDE, CALCIUM CHLORIDE 600; 310; 30; 20 MG/100ML; MG/100ML; MG/100ML; MG/100ML
INJECTION, SOLUTION INTRAVENOUS CONTINUOUS
Status: DISCONTINUED | OUTPATIENT
Start: 2023-03-23 | End: 2023-03-23 | Stop reason: HOSPADM

## 2023-03-23 RX ORDER — SODIUM CHLORIDE 9 MG/ML
INJECTION, SOLUTION INTRAVENOUS PRN
Status: DISCONTINUED | OUTPATIENT
Start: 2023-03-23 | End: 2023-03-23 | Stop reason: HOSPADM

## 2023-03-23 RX ORDER — PROPOFOL 10 MG/ML
INJECTION, EMULSION INTRAVENOUS PRN
Status: DISCONTINUED | OUTPATIENT
Start: 2023-03-23 | End: 2023-03-23 | Stop reason: SDUPTHER

## 2023-03-23 RX ORDER — LIDOCAINE HYDROCHLORIDE 10 MG/ML
1 INJECTION, SOLUTION EPIDURAL; INFILTRATION; INTRACAUDAL; PERINEURAL
Status: DISCONTINUED | OUTPATIENT
Start: 2023-03-23 | End: 2023-03-23 | Stop reason: HOSPADM

## 2023-03-23 RX ORDER — SODIUM CHLORIDE 0.9 % (FLUSH) 0.9 %
5-40 SYRINGE (ML) INJECTION PRN
Status: DISCONTINUED | OUTPATIENT
Start: 2023-03-23 | End: 2023-03-23 | Stop reason: HOSPADM

## 2023-03-23 RX ADMIN — PROPOFOL 100 MG: 10 INJECTION, EMULSION INTRAVENOUS at 14:21

## 2023-03-23 RX ADMIN — SODIUM CHLORIDE, SODIUM LACTATE, POTASSIUM CHLORIDE, AND CALCIUM CHLORIDE: 600; 310; 30; 20 INJECTION, SOLUTION INTRAVENOUS at 14:16

## 2023-03-23 RX ADMIN — PROPOFOL 30 MG: 10 INJECTION, EMULSION INTRAVENOUS at 14:24

## 2023-03-23 ASSESSMENT — PAIN SCALES - GENERAL
PAINLEVEL_OUTOF10: 0

## 2023-03-23 NOTE — DISCHARGE INSTRUCTIONS
2022               Content Version: 13.6  © 2006-2023 Healthwise, [employer]. Care instructions adapted under license by [facility] ([facility]). If you have questions about a medical condition or this instruction, always ask your healthcare professional. Carla Rolling any warranty or liability for your use of this information. DISCHARGE SUMMARY from Nurse     POST-PROCEDURE INSTRUCTIONS:    Call your Physician if you:  Observe any excess bleeding. Develop a temperature over 100.5o F. Experience abdominal, shoulder or chest pain. Notice any signs of decreased circulation or nerve impairment to an extremity such as a change in color, persistent numbness, tingling, coldness or increase in pain. Vomit blood or you have nausea and vomiting lasting longer than 4 hours. Are unable to take medications. Are unable to urinate within 8 hours after discharge following general anesthesia or intravenous sedation. For the next 24 hours after receiving general anesthesia or intravenous sedation, or while taking prescription Narcotics, limit your activities:  Do NOT drive a motor vehicle, operate hazard machinery or power tools, or perform tasks that require coordination. The medication you received during your procedure may have some effect on your mental awareness. Do NOT make important personal or business decisions. The medication you received during your procedure may have some effect on your mental awareness. Do NOT drink alcoholic beverages. These drinks do not mix well with the medications that have been given to you. Upon discharge from the hospital, you must be accompanied by a responsible adult. Resume your diet as directed by your physician. Resume medications as your physician has prescribed. Please give a list of your current medications to your Primary Care Provider.   Please update this list whenever your medications are discontinued, doses are changed, or new medications appointments, and more. How Do I Sign Up? In your internet browser, go to [email or web address]. Click on the First Time User? Click Here link in the Sign In box. You will see the New Member Sign Up page. Enter your HungerTimet Access Code exactly as it appears below. You will not need to use this code after youve completed the sign-up process. If you do not sign up before the expiration date, you must request a new code. MyChart Access Code: Activation code not generated  Current FatRedCouch Status: Active (This is the date your MyCTurtle Beacht access code will )    Enter the last four digits of your Social Security Number (xxxx) and Date of Birth (mm/dd/yyyy) as indicated and click Submit. You will be taken to the next sign-up page. Create a HungerTimet ID. This will be your HungerTimet login ID and cannot be changed, so think of one that is secure and easy to remember. Create a FatRedCouch password. You can change your password at any time. Enter your Password Reset Question and Answer. This can be used at a later time if you forget your password. Enter your e-mail address. You will receive e-mail notification when new information is available in [address] Click Sign Up. You can now view and download portions of your medical record. Click the XDC link to download a portable copy of your medical information. Additional Information    If you have questions, please call [phone]. Remember, FatRedCouch is NOT to be used for urgent needs. For medical emergencies, dial 911. Educational references and/or instructions provided during this visit included:    See Attached      APPOINTMENTS:    Per MD Instruction    Discharge information has been reviewed with the patient. The patient verbalized understanding.

## 2023-03-23 NOTE — H&P
History and Physical reviewed; I have examined the patient and there are no pertinent changes. Tata Collins MD, MD   2:07 PM 3/23/2023  [facility], [address] [place] [address]  [email or web address]

## 2023-03-23 NOTE — BRIEF OP NOTE
[address]  Two [facility], [address]      Brief Procedure Note    Aubrey Luke  1965  [id number]    Date of Procedure: 3/23/2023     Preoperative diagnosis: Metastatic adenocarcinoma ([facility] [address] [C79.9]  Obesity (BMI 30-39. 9) [E66.9]    Postoperative diagnosis: Metastatic adenocarcinoma ([address] [C79.9]  Obesity (BMI 30-39. 9) [E66.9]    Type of Anesthesia: MAC (Monitored anesthesia care)    Description of findings: same as post op dx    Procedure: Procedure(s):  EGD ESOPHAGOGASTRODUODENOSCOPY    :  Dr. Catracho Messer MD    Assistant(s): Circulator: Satinder Almanza RN  Circulator Assist: Mary Alice Wade RN  Endoscopy Technician: Gosia Garrett    Devices/implants/grafts/tissues/prosthesis: None    EBL:None    Specimens: * No specimens in log *    Findings: See printed and scanned procedure note    Complications: None    Dr. Catracho Messer MD  3/23/2023  2:39 PM

## 2023-03-23 NOTE — ANESTHESIA PRE PROCEDURE
Department of Anesthesiology  Preprocedure Note       Name:  Casie Beth   Age:  59 y.o.  :  1965                                          MRN:  [id number]         Date:  3/23/2023      Surgeon: Mari Rainey): Yesenia Marc MD    Procedure: Procedure(s):  EGD ESOPHAGOGASTRODUODENOSCOPY    Medications prior to admission:   Prior to Admission medications    Medication Sig Start Date End Date Taking? Authorizing Provider   apixaban (ELIQUIS) 5 MG TABS tablet Take 5 mg by mouth 2 times daily 23  Yes Historical Provider, MD   fluticasone (FLONASE) 50 MCG/ACT nasal spray 1 spray daily as needed 3/10/20  Yes Historical Provider, MD   levothyroxine (SYNTHROID) 50 MCG tablet TAKE ONE TABLET BY MOUTH DAILY 23  Yes Historical Provider, MD   minoxidil (LONITEN) 2.5 MG tablet Hold off for now and resume after seen and discussed with PCP 23  Yes Historical Provider, MD   acetaminophen (TYLENOL) 500 MG tablet Take 500 mg by mouth every 6 hours as needed for Pain   Yes Historical Provider, MD       Current medications:    Current Facility-Administered Medications   Medication Dose Route Frequency Provider Last Rate Last Admin    sodium chloride flush 0.9 % injection 5-40 mL  5-40 mL IntraVENous 2 times per day Felicia Needles, APRN - CRNA        sodium chloride flush 0.9 % injection 5-40 mL  5-40 mL IntraVENous PRN Felicia Needles, APRN - CRNA        0.9 % sodium chloride infusion   IntraVENous PRN Felicia Needles, APRN - CRNA        lidocaine PF 1 % injection 1 mL  1 mL IntraDERmal Once PRN Felicia Needles, APRN - CRNA        famotidine (PEPCID) 20 mg in sodium chloride (PF) 0.9 % 10 mL injection  20 mg IntraVENous Once Felicia Needles, APRN - CRNA        lactated ringers IV soln infusion   IntraVENous Continuous Ta Diamond MD           Allergies:  No Known Allergies    Problem List:  There is no problem list on file for this patient.       Past Medical History:        Diagnosis Date    GERD

## 2023-03-26 ENCOUNTER — APPOINTMENT (OUTPATIENT)
Facility: HOSPITAL | Age: 58
End: 2023-03-26
Payer: COMMERCIAL

## 2023-03-26 ENCOUNTER — HOSPITAL ENCOUNTER (EMERGENCY)
Facility: HOSPITAL | Age: 58
Discharge: HOME HEALTH | End: 2023-03-26
Attending: STUDENT IN AN ORGANIZED HEALTH CARE EDUCATION/TRAINING PROGRAM
Payer: COMMERCIAL

## 2023-03-26 VITALS
HEIGHT: 62 IN | WEIGHT: 160 LBS | HEART RATE: 93 BPM | DIASTOLIC BLOOD PRESSURE: 89 MMHG | RESPIRATION RATE: 16 BRPM | TEMPERATURE: 98.7 F | BODY MASS INDEX: 29.44 KG/M2 | OXYGEN SATURATION: 100 % | SYSTOLIC BLOOD PRESSURE: 138 MMHG

## 2023-03-26 DIAGNOSIS — C79.89 SECONDARY MALIGNANCY OF SOFT TISSUES OF ABDOMEN (HCC): Primary | ICD-10-CM

## 2023-03-26 LAB
ALBUMIN SERPL-MCNC: 3.3 G/DL (ref 3.4–5)
ALBUMIN/GLOB SERPL: 0.7 (ref 0.8–1.7)
ALP SERPL-CCNC: 164 U/L (ref 45–117)
ALT SERPL-CCNC: 62 U/L (ref 13–56)
ANION GAP SERPL CALC-SCNC: 4 MMOL/L (ref 3–18)
AST SERPL-CCNC: 56 U/L (ref 10–38)
BASOPHILS # BLD: 0 K/UL (ref 0–0.1)
BASOPHILS NFR BLD: 1 % (ref 0–2)
BILIRUB SERPL-MCNC: 0.5 MG/DL (ref 0.2–1)
BUN SERPL-MCNC: 10 MG/DL (ref 7–18)
BUN/CREAT SERPL: 12 (ref 12–20)
CALCIUM SERPL-MCNC: 9.3 MG/DL (ref 8.5–10.1)
CHLORIDE SERPL-SCNC: 101 MMOL/L (ref 100–111)
CO2 SERPL-SCNC: 29 MMOL/L (ref 21–32)
CREAT SERPL-MCNC: 0.81 MG/DL (ref 0.6–1.3)
DIFFERENTIAL METHOD BLD: NORMAL
EOSINOPHIL # BLD: 0.1 K/UL (ref 0–0.4)
EOSINOPHIL NFR BLD: 1 % (ref 0–5)
ERYTHROCYTE [DISTWIDTH] IN BLOOD BY AUTOMATED COUNT: 12 % (ref 11.6–14.5)
GLOBULIN SER CALC-MCNC: 4.9 G/DL (ref 2–4)
GLUCOSE SERPL-MCNC: 126 MG/DL (ref 74–99)
HCT VFR BLD AUTO: 44.3 % (ref 35–45)
HGB BLD-MCNC: 14.8 G/DL (ref 12–16)
IMM GRANULOCYTES # BLD AUTO: 0 K/UL (ref 0–0.04)
IMM GRANULOCYTES NFR BLD AUTO: 0 % (ref 0–0.5)
LACTATE SERPL-SCNC: 1.6 MMOL/L (ref 0.4–2)
LIPASE SERPL-CCNC: 78 U/L (ref 73–393)
LYMPHOCYTES # BLD: 1.7 K/UL (ref 0.9–3.6)
LYMPHOCYTES NFR BLD: 21 % (ref 21–52)
MCH RBC QN AUTO: 29.6 PG (ref 24–34)
MCHC RBC AUTO-ENTMCNC: 33.4 G/DL (ref 31–37)
MCV RBC AUTO: 88.6 FL (ref 78–100)
MONOCYTES # BLD: 0.5 K/UL (ref 0.05–1.2)
MONOCYTES NFR BLD: 6 % (ref 3–10)
NEUTS SEG # BLD: 5.5 K/UL (ref 1.8–8)
NEUTS SEG NFR BLD: 71 % (ref 40–73)
NRBC # BLD: 0 K/UL (ref 0–0.01)
NRBC BLD-RTO: 0 PER 100 WBC
PLATELET # BLD AUTO: 372 K/UL (ref 135–420)
PMV BLD AUTO: 9.6 FL (ref 9.2–11.8)
POTASSIUM SERPL-SCNC: 3.4 MMOL/L (ref 3.5–5.5)
PROT SERPL-MCNC: 8.2 G/DL (ref 6.4–8.2)
RBC # BLD AUTO: 5 M/UL (ref 4.2–5.3)
SODIUM SERPL-SCNC: 134 MMOL/L (ref 136–145)
WBC # BLD AUTO: 7.7 K/UL (ref 4.6–13.2)

## 2023-03-26 PROCEDURE — 74018 RADEX ABDOMEN 1 VIEW: CPT

## 2023-03-26 PROCEDURE — 96375 TX/PRO/DX INJ NEW DRUG ADDON: CPT

## 2023-03-26 PROCEDURE — 6360000002 HC RX W HCPCS: Performed by: STUDENT IN AN ORGANIZED HEALTH CARE EDUCATION/TRAINING PROGRAM

## 2023-03-26 PROCEDURE — 80053 COMPREHEN METABOLIC PANEL: CPT

## 2023-03-26 PROCEDURE — 85025 COMPLETE CBC W/AUTO DIFF WBC: CPT

## 2023-03-26 PROCEDURE — 96374 THER/PROPH/DIAG INJ IV PUSH: CPT

## 2023-03-26 PROCEDURE — 74177 CT ABD & PELVIS W/CONTRAST: CPT

## 2023-03-26 PROCEDURE — 2580000003 HC RX 258: Performed by: STUDENT IN AN ORGANIZED HEALTH CARE EDUCATION/TRAINING PROGRAM

## 2023-03-26 PROCEDURE — 96376 TX/PRO/DX INJ SAME DRUG ADON: CPT

## 2023-03-26 PROCEDURE — 99285 EMERGENCY DEPT VISIT HI MDM: CPT

## 2023-03-26 PROCEDURE — 71045 X-RAY EXAM CHEST 1 VIEW: CPT

## 2023-03-26 PROCEDURE — 6360000004 HC RX CONTRAST MEDICATION: Performed by: STUDENT IN AN ORGANIZED HEALTH CARE EDUCATION/TRAINING PROGRAM

## 2023-03-26 PROCEDURE — 83690 ASSAY OF LIPASE: CPT

## 2023-03-26 PROCEDURE — 83605 ASSAY OF LACTIC ACID: CPT

## 2023-03-26 RX ORDER — FENTANYL CITRATE 50 UG/ML
50 INJECTION, SOLUTION INTRAMUSCULAR; INTRAVENOUS
Status: COMPLETED | OUTPATIENT
Start: 2023-03-26 | End: 2023-03-26

## 2023-03-26 RX ORDER — FENTANYL CITRATE 50 UG/ML
INJECTION, SOLUTION INTRAMUSCULAR; INTRAVENOUS
Status: DISCONTINUED
Start: 2023-03-26 | End: 2023-03-26 | Stop reason: HOSPADM

## 2023-03-26 RX ORDER — ONDANSETRON 2 MG/ML
4 INJECTION INTRAMUSCULAR; INTRAVENOUS
Status: COMPLETED | OUTPATIENT
Start: 2023-03-26 | End: 2023-03-26

## 2023-03-26 RX ORDER — 0.9 % SODIUM CHLORIDE 0.9 %
1000 INTRAVENOUS SOLUTION INTRAVENOUS ONCE
Status: COMPLETED | OUTPATIENT
Start: 2023-03-26 | End: 2023-03-26

## 2023-03-26 RX ADMIN — IOPAMIDOL 70 ML: 612 INJECTION, SOLUTION INTRAVENOUS at 18:42

## 2023-03-26 RX ADMIN — FENTANYL CITRATE 50 MCG: 50 INJECTION INTRAMUSCULAR; INTRAVENOUS at 20:54

## 2023-03-26 RX ADMIN — ONDANSETRON 4 MG: 2 INJECTION INTRAMUSCULAR; INTRAVENOUS at 20:54

## 2023-03-26 RX ADMIN — ONDANSETRON 4 MG: 2 INJECTION INTRAMUSCULAR; INTRAVENOUS at 19:01

## 2023-03-26 RX ADMIN — SODIUM CHLORIDE 1000 ML: 9 INJECTION, SOLUTION INTRAVENOUS at 20:54

## 2023-03-26 RX ADMIN — FENTANYL CITRATE 50 MCG: 50 INJECTION INTRAMUSCULAR; INTRAVENOUS at 14:49

## 2023-03-26 RX ADMIN — FENTANYL CITRATE 50 MCG: 50 INJECTION INTRAMUSCULAR; INTRAVENOUS at 19:01

## 2023-03-26 RX ADMIN — ONDANSETRON 4 MG: 2 INJECTION INTRAMUSCULAR; INTRAVENOUS at 14:49

## 2023-03-26 ASSESSMENT — LIFESTYLE VARIABLES
HOW OFTEN DO YOU HAVE A DRINK CONTAINING ALCOHOL: NEVER
HOW MANY STANDARD DRINKS CONTAINING ALCOHOL DO YOU HAVE ON A TYPICAL DAY: PATIENT DOES NOT DRINK

## 2023-03-26 NOTE — ED TRIAGE NOTES
Client reports having generalized abdominal pain x 2 days. Client had recent upper endoscopy. Reports having constipation, until taking dulcolax today, with success. Client reports abdominal crampng. NAD. AXOX4. Client had recent dx with cancer.

## 2023-03-27 NOTE — ED NOTES
Patient signed out to me pending results of CT scan. CT scan shows diffuse cancer and inflammation throughout the abdomen but no signs of obstruction. Patient has follow-up with her oncologist tomorrow morning. Her pain is controlled at this time. She is requesting fluids. Given bolus of fluid and oral hydration. Patient be discharged home. She states that she has no pain medications at home    Patient stable for discharge at this time. Patient is in agreement with the plan to be discharged at this time. All the patient's questions were answered. Patient was given written instructions on the diagnosis, and states understanding of the plan moving forward. We did discuss important signs and symptoms that should prompt quick return to the emergency department. Disposition: Patient was discharged home in stable condition.   They will follow up with oncology    Prescriptions: None    Diagnosis: Malignancy of the abdomen     Jessenia Guaman MD  03/26/23 2054
Report received from [facility], Elia Berger, RN  03/26/23 Rito Ludwig
air versus dilated loops of bowel. Patient does have large stool burden but no evidence of free air, no leukocytosis, mild stable anemia, normal electrolytes. There was some concern for possible partial visualization of small bowel obstruction. CT abdomen pelvis to further characterize this possible complication. Patient's oncologist did contact us and this provided input the patient to be discharged if this is normal.  We will plan for better bowel regiment upon discharge if this occurs. Pt turned over to Dr. Davidson Palafox for further care and final disposition pending interpretation of CT imaging    ED Course as of 03/26/23 1936   [employer] Mar 26, 2023   1820 Pts oncologist, Dr. Lamine Alfaro, called ER - willing to be contacted if any changes - [phone]. Provided input that if no bowel obstruction is seen on CT, pt is OK to follow up in outpatient setting. [MP]   1919 CT after hours called - they had no record the scan being transmitted. They will pull image and read, concern for poss obstruction [MP]      ED Course User Index  [MP] Leona Dahl DO         Procedures          Diagnosis     Clinical Impression: No diagnosis found. Disposition: Pending final imaging interpretation      Disclaimer: Sections of this note are dictated using utilizing voice recognition software. Minor typographical errors may be present. If questions arise, please do not hesitate to contact me or call our department.          Leona Dahl DO  Resident  03/26/23 7077

## 2023-10-18 NOTE — ANESTHESIA POSTPROCEDURE EVALUATION
Department of Anesthesiology  Postprocedure Note    Patient: Po Asif  MRN: [id number]  YOB: 1965  Date of evaluation: 3/23/2023      Procedure Summary     Date: 03/23/23 Room / Location: [facility] ENDO 02 / [facility] ENDOSCOPY    Anesthesia Start: 1416 Anesthesia Stop: 8345    Procedure: EGD ESOPHAGOGASTRODUODENOSCOPY (Upper GI Region) Diagnosis:       Metastatic adenocarcinoma ([address]      Obesity (BMI 30-39.9)      (Metastatic adenocarcinoma ([address] [C79.9])      (Obesity (BMI 30-39. 9) [E66.9])    Surgeons: Fernie Peacock MD Responsible Provider: Elijah Lopez MD    Anesthesia Type: MAC ASA Status: 2          Anesthesia Type: MAC    Jarrell Phase I: Jarrell Score: 10    Jarrell Phase II: Jarrell Score: 10      Anesthesia Post Evaluation    Patient location during evaluation: bedside  Patient participation: complete - patient participated  Level of consciousness: responsive to verbal stimuli  Airway patency: patent  Nausea & Vomiting: no nausea  Complications: no  Respiratory status: acceptable  Hydration status: euvolemic
no

## (undated) DEVICE — STERILE POLYISOPRENE POWDER-FREE SURGICAL GLOVES: Brand: PROTEXIS

## (undated) DEVICE — SYRINGE MED 25GA 3ML L5/8IN SUBQ PLAS W/ DETACH NDL SFTY

## (undated) DEVICE — FORCEPS BX L240CM JAW DIA2.4MM ORNG L CAP W/ NDL DISP RAD

## (undated) DEVICE — SOLUTION IRRIG 1000ML H2O STRL BLT

## (undated) DEVICE — YANKAUER,SMOOTH HANDLE,HIGH CAPACITY: Brand: MEDLINE INDUSTRIES, INC.

## (undated) DEVICE — ENDOSCOPY PUMP TUBING/ CAP SET: Brand: ERBE

## (undated) DEVICE — BITE BLOCK ENDOSCP UNIV AD 6 TO 9.4 MM

## (undated) DEVICE — BASIN EMSIS 16OZ GRAPHITE PLAS KID SHP MOLD GRAD FOR ORAL

## (undated) DEVICE — UNDERPAD INCONT W23XL36IN STD BLU POLYPR BK FLUF SFT

## (undated) DEVICE — GAUZE,SPONGE,4"X4",16PLY,STRL,LF,10/TRAY: Brand: MEDLINE

## (undated) DEVICE — CATHETER SUCT TR FL TIP 14FR W/ O CTRL

## (undated) DEVICE — CANNULA NSL AD TBNG L14FT STD PVC O2 CRV CONN NONFLARED NSL

## (undated) DEVICE — MEDI-VAC NON-CONDUCTIVE SUCTION TUBING: Brand: CARDINAL HEALTH

## (undated) DEVICE — SYRINGE MED 50ML LUERSLIP TIP

## (undated) DEVICE — LINER SUCT CANSTR 3000CC PLAS SFT PRE ASSEMB W/OUT TBNG W/